# Patient Record
Sex: FEMALE | Race: WHITE | NOT HISPANIC OR LATINO
[De-identification: names, ages, dates, MRNs, and addresses within clinical notes are randomized per-mention and may not be internally consistent; named-entity substitution may affect disease eponyms.]

---
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
## 2022-05-27 ENCOUNTER — RESULT REVIEW (OUTPATIENT)
Age: 22
End: 2022-05-27

## 2024-05-17 ENCOUNTER — APPOINTMENT (OUTPATIENT)
Dept: ORTHOPEDIC SURGERY | Facility: CLINIC | Age: 24
End: 2024-05-17
Payer: COMMERCIAL

## 2024-05-17 VITALS — HEIGHT: 62 IN | BODY MASS INDEX: 22.08 KG/M2 | WEIGHT: 120 LBS

## 2024-05-17 DIAGNOSIS — M40.04 POSTURAL KYPHOSIS, THORACIC REGION: ICD-10-CM

## 2024-05-17 PROCEDURE — ZZZZZ: CPT

## 2024-05-19 PROBLEM — M40.04: Status: ACTIVE | Noted: 2024-05-19

## 2024-05-19 NOTE — DISCUSSION/SUMMARY
[de-identified] : 23-year-old female with chronic scapulothoracic pain. X-ray today revealed mild lumbar scoliosis T11-L3 measuring 16 degrees. Good lordosis. No evidence of spondylolisthesis or pars fracture. Thoracic kyphosis measuring 55 degrees.  Referred for physical therapy focusing on the scapulothoracic area. Discussed adjusting posture and continuing NSAIDs as needed. Follow-up in 6 weeks. Cumulative encounter duration exceeded 45 minutes  Prior to appointment and during encounter with patient extensive medical records were reviewed including but not limited to, hospital records, outpatient records, imaging results, and lab data. During this appointment the patient was examined, diagnoses were discussed and explained in a face to face manner. In addition extensive time was spent reviewing aforementioned diagnostic studies. Counseling including abnormal image results, differential diagnoses, treatment options, risk and benefits, lifestyle changes, current condition, and current medications was performed. Patient's comments, questions, and concerns were addressed and patient verbalized understanding. Based on this patient's presentation at our office, which is an orthopedic spine surgeon's office, this patient inherently / intrinsically has a risk, however minute, of developing issues such as Cauda equina syndrome, bowel and bladder changes, or progression of motor or neurological deficits such as paralysis which may be permanent.  CONSTANCE PANTOJA Acting as a Scribe for Dr. Nicanor CHRISTIANSON, Constance Pantoja, attest that this documentation has been prepared under the direction and in the presence of Provider Kapil Vick MD.

## 2024-05-19 NOTE — HISTORY OF PRESENT ILLNESS
[de-identified] : 05/17/2024 - 23-year-old female is presenting for an initial evaluation of chronic back pain that has been ongoing since her teenage years. She primarily complains of upper back and scapular pain. Secondary complaints of tailbone pain, although this is not as limiting. The pain worsens with extended periods of standing, and she feels that her posture is poor. She previously completed physical therapy years ago, with some improvement noted, although it's challenging to assess the extent of improvement due to the length of time since then. She takes Advil for pain at least once a week. There are no constitutional symptoms, and her general medical health is good.  The patient is a 23 year female who presents today complaining of neck, and mid back pain Date of Injury/Onset: chronic Pain: At Rest: 2-7/10  With Activity:  2/10  Mechanism of injury: no specific injury Quality of symptoms: achy, shooting at times Improves with: Advil, stretching Worse with: prolonged sitting, standing Prior treatment: n/a Prior Imaging: n/a Additional Information: history of Scoliosis

## 2024-05-19 NOTE — PHYSICAL EXAM
[Normal Coordination] : normal coordination [Normal DTR UE/LE] : normal DTR UE/LE  [Normal Sensation] : normal sensation [Normal Mood and Affect] : normal mood and affect [Oriented] : oriented [Able to Communicate] : able to communicate [Normal Skin] : normal skin [No Rash] : no rash [No Ulcers] : no ulcers [No Lesions] : no lesions [No obvious lymphadenopathy in areas examined] : no obvious lymphadenopathy in areas examined [Well Developed] : well developed [NL (80)] : right lateral rotation 80 degrees [Biceps 2+] : biceps 2+ [Triceps 2+] : triceps 2+ [Brachioradialis 2+] : brachioradialis 2+ [NL (90)] : forward flexion 90 degrees [NL (30)] : right lateral rotation 30 degrees [NL (45)] : extension 45 degrees [NL (40)] : right lateral bending 40 degrees [5___] : right extensor hallicus longus 5[unfilled]/5 [Peripheral vascular exam is grossly normal] : peripheral vascular exam is grossly normal [No Respiratory Distress] : no respiratory distress [] : non-antalgic

## 2024-06-22 ENCOUNTER — APPOINTMENT (OUTPATIENT)
Dept: ORTHOPEDIC SURGERY | Facility: CLINIC | Age: 24
End: 2024-06-22
Payer: COMMERCIAL

## 2024-06-22 VITALS — BODY MASS INDEX: 22.08 KG/M2 | HEIGHT: 62 IN | WEIGHT: 120 LBS

## 2024-06-22 DIAGNOSIS — M79.18 MYALGIA, OTHER SITE: ICD-10-CM

## 2024-06-22 PROCEDURE — 99203 OFFICE O/P NEW LOW 30 MIN: CPT

## 2024-06-23 NOTE — PHYSICAL EXAM
[Normal Coordination] : normal coordination [Normal DTR UE/LE] : normal DTR UE/LE  [Normal Sensation] : normal sensation [Normal Mood and Affect] : normal mood and affect [Oriented] : oriented [Able to Communicate] : able to communicate [Normal Skin] : normal skin [No Ulcers] : no ulcers [No Rash] : no rash [No Lesions] : no lesions [No obvious lymphadenopathy in areas examined] : no obvious lymphadenopathy in areas examined [Well Developed] : well developed [Peripheral vascular exam is grossly normal] : peripheral vascular exam is grossly normal [No Respiratory Distress] : no respiratory distress [Flexion] : flexion [Extension] : extension [] : negative Spurling

## 2024-06-23 NOTE — DISCUSSION/SUMMARY
[de-identified] : Discussed continued medical mgmt, judicious use nsaids. Discussed activity modification. Discussed exercise based rehabilitation, renewal given for physical therapy. Referred to acupuncture. Follow up 6 weeks.

## 2024-06-23 NOTE — HISTORY OF PRESENT ILLNESS
[Lower back] : lower back [6] : 6 [3] : 3 [Dull/Aching] : dull/aching [Radiating] : radiating [Intermittent] : intermittent [Rest] : rest [Standing] : standing [de-identified] : Patient  seen in follow up. Reports persistent bilateral trapezius and periscapular pain. Pain severity reduced compared to last encounter. Advil and physical therapy helpful. Denies radicular pain. [] : no [FreeTextEntry7] : [facility] [de-identified] : Xray [facility] [de-identified] : PT 2x per week

## 2024-07-29 ENCOUNTER — APPOINTMENT (OUTPATIENT)
Dept: ORTHOPEDIC SURGERY | Facility: CLINIC | Age: 24
End: 2024-07-29

## 2024-07-29 VITALS — BODY MASS INDEX: 22.08 KG/M2 | HEIGHT: 62 IN | WEIGHT: 120 LBS

## 2024-07-29 DIAGNOSIS — Z78.9 OTHER SPECIFIED HEALTH STATUS: ICD-10-CM

## 2024-07-29 DIAGNOSIS — M79.18 MYALGIA, OTHER SITE: ICD-10-CM

## 2024-07-29 DIAGNOSIS — M40.04 POSTURAL KYPHOSIS, THORACIC REGION: ICD-10-CM

## 2024-07-29 PROCEDURE — 99213 OFFICE O/P EST LOW 20 MIN: CPT

## 2024-08-01 NOTE — HISTORY OF PRESENT ILLNESS
[3] : 3 [Full time] : Work status: full time [de-identified] : 07/29/2024 - The patient is presenting for a follow-up, reporting the same symptoms. The pain is focal to the back without radiculopathy into the legs and is higher up than usual, specifically in the scapulothoracic region. She has been enrolled in physical therapy, which she finds helpful, and is optimistic about starting acupuncture. Takes advil when pain is worse.   06/22/2024 - Patient  seen in follow up. Reports persistent bilateral trapezius and periscapular pain. Pain severity reduced compared to last encounter. Advil and physical therapy helpful. Denies radicular pain. [de-identified] : p/t

## 2024-08-01 NOTE — PHYSICAL EXAM
[Normal Coordination] : normal coordination [Normal DTR UE/LE] : normal DTR UE/LE  [Normal Sensation] : normal sensation [Normal Mood and Affect] : normal mood and affect [Oriented] : oriented [Able to Communicate] : able to communicate [Normal Skin] : normal skin [No Rash] : no rash [No Ulcers] : no ulcers [No Lesions] : no lesions [No obvious lymphadenopathy in areas examined] : no obvious lymphadenopathy in areas examined [Well Developed] : well developed [Peripheral vascular exam is grossly normal] : peripheral vascular exam is grossly normal [No Respiratory Distress] : no respiratory distress [Flexion] : flexion [Extension] : extension [] : patient ambulates without assistive device

## 2024-08-01 NOTE — DISCUSSION/SUMMARY
[de-identified] : Discussed continued medical mgmt, Advil during heightened severity of pain. Discussed activity modification. Discussed exercise based rehabilitation, continuation of physical therapy and transition into [facility]. She will follow through with planned acupuncture treatments (referral provided). Discussed applying Thermacare bandages or Voltaren gel to affected area. F/u 6 weeks.   Prior to appointment and during encounter with patient extensive medical records were reviewed including but not limited to, hospital records, outpatient records, imaging results, and lab data. During this appointment the patient was examined, diagnoses were discussed and explained in a face to face manner. In addition extensive time was spent reviewing aforementioned diagnostic studies. Counseling including abnormal image results, differential diagnoses, treatment options, risk and benefits, lifestyle changes, current condition, and current medications was performed. Patient's comments, questions, and concerns were addressed and patient verbalized understanding. Based on this patient's presentation at our office, which is an orthopedic spine surgeon's office, this patient inherently / intrinsically has a risk, however minute, of developing issues such as Cauda equina syndrome, bowel and bladder changes, or progression of motor or neurological deficits such as paralysis which may be permanent.  CONSTANCE PANTOJA Acting as a Scribe for Dr. Nicanor CHRISTIANSON, Constance Pantoja, attest that this documentation has been prepared under the direction and in the presence of Provider Kapil Vick MD.

## 2024-08-01 NOTE — HISTORY OF PRESENT ILLNESS
[3] : 3 [Full time] : Work status: full time [de-identified] : 07/29/2024 - The patient is presenting for a follow-up, reporting the same symptoms. The pain is focal to the back without radiculopathy into the legs and is higher up than usual, specifically in the scapulothoracic region. She has been enrolled in physical therapy, which she finds helpful, and is optimistic about starting acupuncture. Takes advil when pain is worse.   06/22/2024 - Patient  seen in follow up. Reports persistent bilateral trapezius and periscapular pain. Pain severity reduced compared to last encounter. Advil and physical therapy helpful. Denies radicular pain. [de-identified] : p/t